# Patient Record
Sex: FEMALE | Race: BLACK OR AFRICAN AMERICAN
[De-identification: names, ages, dates, MRNs, and addresses within clinical notes are randomized per-mention and may not be internally consistent; named-entity substitution may affect disease eponyms.]

---

## 2017-03-31 ENCOUNTER — HOSPITAL ENCOUNTER (EMERGENCY)
Dept: HOSPITAL 62 - ER | Age: 42
Discharge: HOME | End: 2017-03-31
Payer: SELF-PAY

## 2017-03-31 VITALS — DIASTOLIC BLOOD PRESSURE: 85 MMHG | SYSTOLIC BLOOD PRESSURE: 156 MMHG

## 2017-03-31 DIAGNOSIS — R11.2: ICD-10-CM

## 2017-03-31 DIAGNOSIS — R51: ICD-10-CM

## 2017-03-31 DIAGNOSIS — D64.9: Primary | ICD-10-CM

## 2017-03-31 DIAGNOSIS — R53.1: ICD-10-CM

## 2017-03-31 DIAGNOSIS — R42: ICD-10-CM

## 2017-03-31 LAB
%HYPO/RBC NFR BLD AUTO: (no result) %
ALBUMIN SERPL-MCNC: 5.1 G/DL (ref 3.5–5)
ALP SERPL-CCNC: 63 U/L (ref 38–126)
ALT SERPL-CCNC: 27 U/L (ref 9–52)
ANION GAP SERPL CALC-SCNC: 16 MMOL/L (ref 5–19)
APPEARANCE UR: (no result)
APTT BLD: 32 SEC (ref 23.5–35.8)
AST SERPL-CCNC: 34 U/L (ref 14–36)
BASOPHILS # BLD AUTO: 0 10^3/UL (ref 0–0.2)
BASOPHILS NFR BLD AUTO: 0.3 % (ref 0–2)
BILIRUB DIRECT SERPL-MCNC: 0.2 MG/DL (ref 0–0.4)
BILIRUB SERPL-MCNC: 0.8 MG/DL (ref 0.2–1.3)
BILIRUB UR QL STRIP: NEGATIVE
BUN SERPL-MCNC: 10 MG/DL (ref 7–20)
CALCIUM: 10.2 MG/DL (ref 8.4–10.2)
CHLORIDE SERPL-SCNC: 100 MMOL/L (ref 98–107)
CO2 SERPL-SCNC: 22 MMOL/L (ref 22–30)
CREAT SERPL-MCNC: 0.64 MG/DL (ref 0.52–1.25)
EOSINOPHIL # BLD AUTO: 0 10^3/UL (ref 0–0.6)
EOSINOPHIL NFR BLD AUTO: 0 % (ref 0–6)
ERYTHROCYTE [DISTWIDTH] IN BLOOD BY AUTOMATED COUNT: 19.7 % (ref 11.5–14)
ERYTHROCYTE [DISTWIDTH] IN BLOOD BY AUTOMATED COUNT: 19.7 % (ref 11.5–14)
GLUCOSE SERPL-MCNC: 103 MG/DL (ref 75–110)
GLUCOSE UR STRIP-MCNC: NEGATIVE MG/DL
HCT VFR BLD CALC: 28 % (ref 36–47)
HCT VFR BLD CALC: 28.7 % (ref 36–47)
HGB BLD-MCNC: 8.3 G/DL (ref 12–15.5)
HGB BLD-MCNC: 8.5 G/DL (ref 12–15.5)
HGB HCT DIFFERENCE: -3.1
HGB HCT DIFFERENCE: -3.2
KETONES UR STRIP-MCNC: 20 MG/DL
LIPASE SERPL-CCNC: 101.7 U/L (ref 23–300)
LYMPHOCYTES # BLD AUTO: 1.2 10^3/UL (ref 0.5–4.7)
LYMPHOCYTES NFR BLD AUTO: 12.7 % (ref 13–45)
MCH RBC QN AUTO: 19.2 PG (ref 27–33.4)
MCH RBC QN AUTO: 19.3 PG (ref 27–33.4)
MCHC RBC AUTO-ENTMCNC: 29.6 G/DL (ref 32–36)
MCHC RBC AUTO-ENTMCNC: 29.8 G/DL (ref 32–36)
MCV RBC AUTO: 65 FL (ref 80–97)
MCV RBC AUTO: 65 FL (ref 80–97)
MICROCYTES BLD QL SMEAR: (no result)
MONOCYTES # BLD AUTO: 0.4 10^3/UL (ref 0.1–1.4)
MONOCYTES NFR BLD AUTO: 4.1 % (ref 3–13)
NEUTROPHILS # BLD AUTO: 7.7 10^3/UL (ref 1.7–8.2)
NEUTS SEG NFR BLD AUTO: 82.9 % (ref 42–78)
NITRITE UR QL STRIP: NEGATIVE
OVALOCYTES BLD QL SMEAR: (no result)
PH UR STRIP: 5 [PH] (ref 5–9)
POIKILOCYTOSIS BLD QL SMEAR: (no result)
POLYCHROMASIA BLD QL SMEAR: SLIGHT
POTASSIUM SERPL-SCNC: 4.2 MMOL/L (ref 3.6–5)
PROT SERPL-MCNC: 9.2 G/DL (ref 6.3–8.2)
PROT UR STRIP-MCNC: 30 MG/DL
PROTHROMBIN TIME: 13.6 SEC (ref 11.4–15.4)
RBC # BLD AUTO: 4.33 10^6/UL (ref 3.72–5.28)
RBC # BLD AUTO: 4.42 10^6/UL (ref 3.72–5.28)
SODIUM SERPL-SCNC: 138 MMOL/L (ref 137–145)
SP GR UR STRIP: 1.02
UROBILINOGEN UR-MCNC: NEGATIVE MG/DL (ref ?–2)
WBC # BLD AUTO: 8.9 10^3/UL (ref 4–10.5)
WBC # BLD AUTO: 9.2 10^3/UL (ref 4–10.5)

## 2017-03-31 PROCEDURE — S0119 ONDANSETRON 4 MG: HCPCS

## 2017-03-31 PROCEDURE — 80053 COMPREHEN METABOLIC PANEL: CPT

## 2017-03-31 PROCEDURE — 85610 PROTHROMBIN TIME: CPT

## 2017-03-31 PROCEDURE — 81001 URINALYSIS AUTO W/SCOPE: CPT

## 2017-03-31 PROCEDURE — 83690 ASSAY OF LIPASE: CPT

## 2017-03-31 PROCEDURE — 85730 THROMBOPLASTIN TIME PARTIAL: CPT

## 2017-03-31 PROCEDURE — 99284 EMERGENCY DEPT VISIT MOD MDM: CPT

## 2017-03-31 PROCEDURE — 85027 COMPLETE CBC AUTOMATED: CPT

## 2017-03-31 PROCEDURE — 96361 HYDRATE IV INFUSION ADD-ON: CPT

## 2017-03-31 PROCEDURE — 96374 THER/PROPH/DIAG INJ IV PUSH: CPT

## 2017-03-31 PROCEDURE — 96375 TX/PRO/DX INJ NEW DRUG ADDON: CPT

## 2017-03-31 PROCEDURE — 85025 COMPLETE CBC W/AUTO DIFF WBC: CPT

## 2017-03-31 PROCEDURE — 84702 CHORIONIC GONADOTROPIN TEST: CPT

## 2017-03-31 PROCEDURE — 36415 COLL VENOUS BLD VENIPUNCTURE: CPT

## 2017-03-31 NOTE — ER DOCUMENT REPORT
ED General





- General


Chief Complaint: Headache


Stated Complaint: HEADACHE,WEAKNESS


Mode of Arrival: Ambulatory


Information source: Patient


Notes: 


Patient presents complaining of headache that started yesterday with nausea and 

vomiting.  Patient states she's vomited multiple times today.  Patient noticed 

some blood in her emesis today which prompted her to come in.  Patient does 

complain of some generalized weakness and dizziness.  Patient denies any fever.

  Patient does complain of some dysuria.  Patient does report a previous 

history of heavy menses and a history of iron deficiency anemia in the past 

although is not on any current supplements for anemia.


TRAVEL OUTSIDE OF THE [place] IN LAST 30 DAYS: No





- HPI


Onset: Yesterday


Onset/Duration: Persistent


Quality of pain: Achy


Pain Level: 5


Associated symptoms: Headache, Nausea, Vomiting, Weakness.  denies: Chest pain, 

Nonproductive cough, Productive cough, Diarrhea, Fever


Exacerbated by: Denies


Relieved by: Denies


Similar symptoms previously: Yes


Recently seen / treated by doctor: No





- Related Data


Allergies/Adverse Reactions: 


 





No Known Allergies Allergy (Verified 03/31/17 11:32)


 











Past Medical History





- General


Information source: Patient


Last Menstrual Period: 03/15/2017





- Social History


Smoking Status: Never Smoker


Chew tobacco use (# tins/day): No


Frequency of alcohol use: None


Drug Abuse: None


Occupation: none


Lives with: Family


Family History: Reviewed & Not Pertinent


Patient has suicidal ideation: No


Patient has homicidal ideation: No





- Medical History


Medical History: Other - Anemia





- Past Medical History


Cardiac Medical History: Reports: Hx Hypertension


Neurological Medical History: Reports: Hx Migraine


Endocrine Medical History: Reports: Hx Diabetes Mellitus Type 2


Renal/ Medical History: Denies: Hx Peritoneal Dialysis


Psychiatric Medical History: 


   Denies: Hx Depression


Surgical Hx: Negative





- Immunizations


Immunizations up to date: Yes


Hx Diphtheria, Pertussis, Tetanus Vaccination: Yes





Review of Systems





- Review of Systems


Constitutional: Weakness.  denies: Fever, Recent illness


EENT: No symptoms reported


Cardiovascular: Chest pain, Dizziness


Respiratory: No symptoms reported.  denies: Cough, Short of breath


Gastrointestinal: Nausea, Vomiting, Blood in vomit.  denies: Abdominal pain, 

Diarrhea


Genitourinary: Dysuria.  denies: Flank pain


Female Genitourinary: Heavy/abnormal periods.  denies: Pregnant, Vaginal 

discharge


Musculoskeletal: No symptoms reported.  denies: Back pain


Skin: No symptoms reported.  denies: Rash


Hematologic/Lymphatic: No symptoms reported


Neurological/Psychological: Weakness, Headaches.  denies: Confusion





Physical Exam





- Vital signs


Vitals: 


 











Temp Pulse Resp BP Pulse Ox


 


 98.3 F   66   20   156/85 H  100 


 


 03/31/17 16:15  03/31/17 16:15  03/31/17 16:15  03/31/17 16:15  03/31/17 16:15














- General


General appearance: Appears well, Alert


In distress: None





- HEENT


Head: Normocephalic, Atraumatic


Eyes: Normal


Conjunctiva: Normal


Nasal: Normal


Mouth/Lips: Normal


Mucous membranes: Dry


Pharynx: Post nasal drainage


Neck: Normal, Supple.  No: Anterior cervical chain, Lymphadenopathy





- Respiratory


Respiratory status: No respiratory distress


Chest status: Nontender


Breath sounds: Normal.  No: Rales, Rhonchi, Stridor, Wheezing


Chest palpation: Normal





- Cardiovascular


Rhythm: Regular


Heart sounds: S1 appreciated, S2 appreciated


Murmur: No





- Abdominal


Inspection: Normal


Distension: No distension


Bowel sounds: Normal


Tenderness: Nontender


Organomegaly: No organomegaly





- Back


Back: Normal, Nontender.  No: CVA tenderness





- Extremities


General upper extremity: Normal inspection, Normal strength


General lower extremity: Normal inspection, Normal strength





- Neurological


Neuro grossly intact: Yes


Cognition: Normal


Dorota Coma Scale Eye Opening: Spontaneous


[place] Coma Scale Verbal: Oriented


Dorota Coma Scale Motor: Obeys Commands


Dorota Coma Scale Total: 15





- Psychological


Associated symptoms: Normal affect, Normal mood





- Skin


Skin Temperature: Warm


Skin Moisture: Dry


Skin Color: Pale





Course





- Re-evaluation


Re-evalutation: 


03/31/17 14:16


IV fluids continue to infuse.  Patient states that headache pain seems to be 

improved at this time.





03/31/17 14:29


Consult with Dr. Velásquez regarding patient presentation.  Recommends repeating 

CBC after IV fluids have infused.





03/31/17 15:48


Patient continues to deny headache pain and states that nausea is resolved at 

this time.  Reviewed patient's repeat CBC, patient is stable for discharge.





- Vital Signs


Vital signs: 


 











Temp Pulse Resp BP Pulse Ox


 


 98.3 F   66   20   156/85 H  100 


 


 03/31/17 16:15  03/31/17 16:15  03/31/17 16:15  03/31/17 16:15  03/31/17 16:15














- Laboratory


Result Diagrams: 


 03/31/17 15:15





 03/31/17 11:55


Laboratory results interpreted by me: 


 











  03/31/17 03/31/17 03/31/17





  11:55 11:55 11:55


 


Hgb  8.3 L  


 


Hct  28.0 L  


 


MCV  65 L  


 


MCH  19.3 L  


 


MCHC  29.8 L  


 


RDW  19.7 H  


 


Seg Neutrophils %  82.9 H  


 


Lymphocytes %  12.7 L  


 


Total Protein   9.2 H 


 


Albumin   5.1 H 


 


Urine Protein    30 H


 


Urine Ketones    20 H


 


Ur Leukocyte Esterase    LARGE H


 


Urine Ascorbic Acid    40 H














  03/31/17





  15:15


 


Hgb  8.5 L


 


Hct  28.7 L


 


MCV  65 L


 


MCH  19.2 L


 


MCHC  29.6 L


 


RDW  19.7 H


 


Seg Neutrophils % 


 


Lymphocytes % 


 


Total Protein 


 


Albumin 


 


Urine Protein 


 


Urine Ketones 


 


Ur Leukocyte Esterase 


 


Urine Ascorbic Acid 








03/31/17 15:48





 Labs- Entire Visit











  03/31/17 03/31/17 03/31/17





  11:55 11:55 11:55


 


WBC  9.2  


 


RBC  4.33  


 


Hgb  8.3 L  


 


Hct  28.0 L  


 


MCV  65 L  


 


MCH  19.3 L  


 


MCHC  29.8 L  


 


RDW  19.7 H  


 


Plt Count  335  


 


Seg Neutrophils %  82.9 H  


 


Lymphocytes %  12.7 L  


 


Monocytes %  4.1  


 


Eosinophils %  0.0  


 


Basophils %  0.3  


 


Absolute Neutrophils  7.7  


 


Absolute Lymphocytes  1.2  


 


Absolute Monocytes  0.4  


 


Absolute Eosinophils  0.0  


 


Absolute Basophils  0.0  


 


Platelet Comment  ADEQUATE  


 


Polychromasia  SLIGHT  


 


Hypochromasia  1+  


 


Poikilocytosis  2+  


 


Microcytosis  3+  


 


Ovalocytes  2+  


 


PT   


 


INR   


 


APTT   


 


Sodium   138.0 


 


Potassium   4.2 


 


Chloride   100 


 


Carbon Dioxide   22 


 


Anion Gap   16 


 


BUN   10 


 


Creatinine   0.64 


 


Est GFR ( Amer)   > 60 


 


Est GFR (Non-Af Amer)   > 60 


 


Glucose   103 


 


Calcium   10.2 


 


Total Bilirubin   0.8 


 


Direct Bilirubin   0.2 


 


Indirect Bilirubin   Not Reportable 


 


Neonat Total Bilirubin   Not Reportable 


 


AST   34 


 


ALT   27 


 


Alkaline Phosphatase   63 


 


Total Protein   9.2 H 


 


Albumin   5.1 H 


 


Lipase   101.7 


 


Beta HCG, Quant   < 2.39 


 


Total Beta HCG   NEGATIVE 


 


Urine Color    YELLOW


 


Urine Appearance    CLOUDY


 


Urine pH    5.0


 


Ur Specific Gravity    1.023


 


Urine Protein    30 H


 


Urine Glucose (UA)    NEGATIVE


 


Urine Ketones    20 H


 


Urine Blood    NEGATIVE


 


Urine Nitrite    NEGATIVE


 


Urine Bilirubin    NEGATIVE


 


Urine Urobilinogen    NEGATIVE


 


Ur Leukocyte Esterase    LARGE H


 


Urine WBC (Auto)    12


 


Urine RBC (Auto)    2


 


Urine Bacteria (Auto)    TRACE


 


Squamous Epi Cells Auto    14


 


Urine Mucus (Auto)    MOD


 


Urine Ascorbic Acid    40 H














  03/31/17 03/31/17





  11:55 15:15


 


WBC   8.9


 


RBC   4.42


 


Hgb   8.5 L


 


Hct   28.7 L


 


MCV   65 L


 


MCH   19.2 L


 


MCHC   29.6 L


 


RDW   19.7 H


 


Plt Count   320


 


Seg Neutrophils %  


 


Lymphocytes %  


 


Monocytes %  


 


Eosinophils %  


 


Basophils %  


 


Absolute Neutrophils  


 


Absolute Lymphocytes  


 


Absolute Monocytes  


 


Absolute Eosinophils  


 


Absolute Basophils  


 


Platelet Comment  


 


Polychromasia  


 


Hypochromasia  


 


Poikilocytosis  


 


Microcytosis  


 


Ovalocytes  


 


PT  13.6 


 


INR  1.01 


 


APTT  32.0 


 


Sodium  


 


Potassium  


 


Chloride  


 


Carbon Dioxide  


 


Anion Gap  


 


BUN  


 


Creatinine  


 


Est GFR ( Amer)  


 


Est GFR (Non-Af Amer)  


 


Glucose  


 


Calcium  


 


Total Bilirubin  


 


Direct Bilirubin  


 


Indirect Bilirubin  


 


Neonat Total Bilirubin  


 


AST  


 


ALT  


 


Alkaline Phosphatase  


 


Total Protein  


 


Albumin  


 


Lipase  


 


Beta HCG, Quant  


 


Total Beta HCG  


 


Urine Color  


 


Urine Appearance  


 


Urine pH  


 


Ur Specific Gravity  


 


Urine Protein  


 


Urine Glucose (UA)  


 


Urine Ketones  


 


Urine Blood  


 


Urine Nitrite  


 


Urine Bilirubin  


 


Urine Urobilinogen  


 


Ur Leukocyte Esterase  


 


Urine WBC (Auto)  


 


Urine RBC (Auto)  


 


Urine Bacteria (Auto)  


 


Squamous Epi Cells Auto  


 


Urine Mucus (Auto)  


 


Urine Ascorbic Acid  














03/31/17 16:34








Discharge





- Discharge


Clinical Impression: 


Anemia


Qualifiers:


 Anemia type: unspecified type Qualified Code(s): D64.9 - Anemia, unspecified





Headache


Qualifiers:


 Headache type: unspecified Headache chronicity pattern: unspecified pattern 

Intractability: not intractable Qualified Code(s): R51 - Headache





Nausea & vomiting


Qualifiers:


 Vomiting type: unspecified Vomiting Intractability: unspecified Qualified Code(

s): R11.2 - Nausea with vomiting, unspecified





Condition: Stable


Disposition: HOME, SELF-CARE


Instructions:  Intravenous (IV) Fluids ([facility]), Headache ([facility]), Antinausea 

Medication ([facility]), Use of Diphenhydramine, Vomiting ([facility]), Anemia, Iron 

Deficiency ([facility])


Additional Instructions: 


Return immediately for any new or worsening symptoms





Followup with your primary care provider, call tomorrow to make a followup 

appointment





Your primary doctor will need to repeat your blood tests to make sure that your 

hemoglobin and hematocrit are continue to improve.





Follow up with a gynecologist for further evaluation of heavy menstrual cycles


Prescriptions: 


Butalb/Acetaminophen/Caffeine [Fioricet (-40 mg) Tablet] 1 - 2 tab PO Q4H 

#15 each


Ferrous Sulfate [Iron] 325 mg PO BID #60 tablet


Promethazine HCl [Phenergan 25 mg Tablet] 25 mg PO Q6H PRN #15 tablet


 PRN Reason: 


Referrals: 


MIKE CARDOZA MD [Primary Care Provider] - Follow up as needed


[facility] [Provider Group] - Follow up in 1 week

## 2017-03-31 NOTE — ER DOCUMENT REPORT
ED Medical Screen (RME)





- General


Stated Complaint: HEADACHE,WEAKNESS


Notes: 


Patient complains of headache, weakness and dizziness, nausea and vomiting 

since yesterday.  States she is seeing some blood in the emesis.  Patient 

states headache started first, and has a history of headaches.  Patient denies 

fever or diarrhea.





I have greeted and performed a rapid initial assessment of this patient.  A 

comprehensive ED assessment and evaluation of the patient, analysis of test 

results and completion of the medical decision making process will be conducted 

by additional ED providers.


TRAVEL OUTSIDE OF THE [place] IN LAST 30 DAYS: No





- Related Data


Allergies/Adverse Reactions: 


 





No Known Allergies Allergy (Verified 03/31/17 11:32)


 











Past Medical History





- Past Medical History


Cardiac Medical History: Reports: Hx Hypertension


Neurological Medical History: Reports: Hx Migraine


Endocrine Medical History: Reports: Hx Diabetes Mellitus Type 2


Psychiatric Medical History: 


   Denies: Hx Depression





- Immunizations


Immunizations up to date: Yes


Hx Diphtheria, Pertussis, Tetanus Vaccination: Yes

## 2018-07-21 ENCOUNTER — HOSPITAL ENCOUNTER (EMERGENCY)
Dept: HOSPITAL 62 - ER | Age: 43
Discharge: HOME | End: 2018-07-21
Payer: SELF-PAY

## 2018-07-21 VITALS — SYSTOLIC BLOOD PRESSURE: 169 MMHG | DIASTOLIC BLOOD PRESSURE: 99 MMHG

## 2018-07-21 DIAGNOSIS — E11.9: ICD-10-CM

## 2018-07-21 DIAGNOSIS — I10: ICD-10-CM

## 2018-07-21 DIAGNOSIS — K08.9: Primary | ICD-10-CM

## 2018-07-21 PROCEDURE — 99282 EMERGENCY DEPT VISIT SF MDM: CPT

## 2018-07-21 NOTE — ER DOCUMENT REPORT
ED General





- General


Chief Complaint: Dental Injury


Stated Complaint: TOOTHACHE


Time Seen by Provider: 07/21/18 01:14


Notes: 





Patient is a 43-year-old female without chronic medical problems who presents 

with one-month of a right lower posterior molar pain.  She describes this area 

as having a severe, constant, throbbing pain.  She has tried aspirin with 

minimal to no relief.  She saw a dentist several weeks ago, was informed that 

the tooth could be extracted to control her pain but could not pay for tooth 

extraction.  She reports that she is in the emergency department tonight 

because the pain is so severe that it is preventing her from sleeping.  Nothing 

seems to worsen the pain.  She denies a history of similar symptoms in the 

past.  She denies any difficulty breathing or swallowing.


TRAVEL OUTSIDE OF THE [place] IN LAST 30 DAYS: No





- Related Data


Allergies/Adverse Reactions: 


 





No Known Allergies Allergy (Verified 03/31/17 11:32)


 











Past Medical History





- General


Information source: Patient





- Social History


Smoking Status: Never Smoker


Frequency of alcohol use: None


Drug Abuse: None


Lives with: Spouse/Significant other


Family History: Reviewed & Not Pertinent


Patient has suicidal ideation: No


Patient has homicidal ideation: No





- Past Medical History


Cardiac Medical History: Reports: Hx Hypertension


Neurological Medical History: Reports: Hx Migraine


Endocrine Medical History: Reports: Hx Diabetes Mellitus Type 2


Renal/ Medical History: Denies: Hx Peritoneal Dialysis


Psychiatric Medical History: 


   Denies: Hx Depression





- Immunizations


Immunizations up to date: Yes


Hx Diphtheria, Pertussis, Tetanus Vaccination: Yes





Review of Systems





- Review of Systems


Notes: 





Constitutional: Negative for fever.


HENT: Positive for dental pain


Eyes: Negative for visual changes.


Cardiovascular: Negative for chest pain.


Respiratory: Negative for shortness of breath.


Gastrointestinal: Negative for abdominal pain, vomiting or diarrhea.


Genitourinary: Negative for dysuria.


Musculoskeletal: Negative for back pain.


Skin: Negative for rash.


Neurological: Negative for headaches, weakness or numbness.





10 point ROS negative except as marked above and in HPI.





Physical Exam





- Vital signs


Vitals: 


 











Temp Pulse Resp BP Pulse Ox


 


 99.0 F   94   18   169/99 H  100 


 


 07/21/18 00:58  07/21/18 00:58  07/21/18 00:58  07/21/18 00:58  07/21/18 00:58











Interpretation: Hypertensive


Notes: 





PHYSICAL EXAMINATION:





GENERAL: Well-appearing, well-nourished and in no acute distress.





HEAD: Atraumatic, normocephalic.





EYES:  sclera anicteric, conjunctiva are normal.





ENT: Moist mucous membranes.  Poor dentition throughout, small chip fracture to 

the mandibular posterior right molar





NECK: Normal range of motion





LUNGS: Normal work of breathing





HEART: 2+ radial pulses bilaterally





EXTREMITIES: no pitting or edema.  No cyanosis.





NEUROLOGICAL: No focal neurological deficits. Moves all extremities 

spontaneously and on command.





PSYCH: Normal mood, normal affect.





SKIN: Warm, Dry, normal turgor, no rashes or lesions noted.





Course





- Re-evaluation


Re-evalutation: 





07/21/18 01:49


Presentation is most consistent with likely a fractured tooth #31 without 

evidence of associated infection.  Airway is patent.  Vitals within normal 

limits.  Patient is able swallow without any difficulty.  There is no 

significant facial swelling.  No evidence of Navarro angina, apical abscess, or 

airway obstruction.  Patient has been having symptoms for over 1 month, has 

already seen a dentist and was told that the tooth could be extracted but could 

not pay for it at that time and reports that she is here because the pain is 

keeping her awake at night.  At this time will discharge with return 

precautions and follow-up recommendations.  Verbal discharge instructions given 

a the bedside and opportunity for questions given. Medication warnings 

reviewed. Patient is in agreement with this plan and has verbalized 

understanding of return precautions and the need for primary care follow-up in 

the next 24-72 hours.





- Vital Signs


Vital signs: 


 











Temp Pulse Resp BP Pulse Ox


 


 99.0 F   94   18   169/99 H  100 


 


 07/21/18 00:58  07/21/18 00:58  07/21/18 00:58  07/21/18 00:58  07/21/18 00:58














Discharge





- Discharge


Clinical Impression: 


 Pain, dental





Condition: Good


Disposition: HOME, SELF-CARE


Additional Instructions: 


You have been seen for dental pain.  It is very important that you follow-up 

with a dentist for definitive care.  Please return if you develop fever greater 

than 101, swelling in your face, vomiting, difficulty breathing or swallowing, 

or any other symptoms that are concerning to you.  For your pain: Take 

ibuprofen 600 mg and acetaminophen 1000 mg every 6 hours together as needed for 

pain.  You can apply the Tessalon Perles that have been prescribed over the 

tooth and allow them to dissolve every 6 hours as needed for additional pain 

that is not controlled by Tylenol and ibuprofen.


Prescriptions: 


Benzonatate [Tessalon Perles 100 mg Capsule] 100 mg PO Q6HP PRN #20 capsule


 PRN Reason: 


Referrals: 


MIKE CARDOZA MD [ACTIVE STAFF] - Follow up as needed